# Patient Record
Sex: FEMALE | Race: WHITE | NOT HISPANIC OR LATINO | Employment: OTHER | URBAN - NONMETROPOLITAN AREA
[De-identification: names, ages, dates, MRNs, and addresses within clinical notes are randomized per-mention and may not be internally consistent; named-entity substitution may affect disease eponyms.]

---

## 2017-05-08 ENCOUNTER — HOSPITAL ENCOUNTER (EMERGENCY)
Facility: HOSPITAL | Age: 62
Discharge: HOME | End: 2017-05-08
Attending: NURSE PRACTITIONER | Admitting: NURSE PRACTITIONER
Payer: COMMERCIAL

## 2017-05-08 VITALS
RESPIRATION RATE: 16 BRPM | OXYGEN SATURATION: 96 % | DIASTOLIC BLOOD PRESSURE: 78 MMHG | TEMPERATURE: 96.6 F | SYSTOLIC BLOOD PRESSURE: 150 MMHG

## 2017-05-08 DIAGNOSIS — S69.91XA FINGER INJURY, RIGHT, INITIAL ENCOUNTER: ICD-10-CM

## 2017-05-08 PROCEDURE — 73140 X-RAY EXAM OF FINGER(S): CPT | Mod: TC,RT

## 2017-05-08 PROCEDURE — 99202 OFFICE O/P NEW SF 15 MIN: CPT | Performed by: NURSE PRACTITIONER

## 2017-05-08 PROCEDURE — 99213 OFFICE O/P EST LOW 20 MIN: CPT

## 2017-05-08 ASSESSMENT — ENCOUNTER SYMPTOMS: CONSTITUTIONAL NEGATIVE: 1

## 2017-05-08 NOTE — ED PROVIDER NOTES
History     Chief Complaint   Patient presents with     Hand Pain     rt small finger pain, notes fell earlier today     The history is provided by the patient. No  was used.     Antonella Orellana is a 61 year old female who presents with pain, bruising and swelling in her R small finger. Fell today at home getting dressed injuring the finger. Has been taking ASA for pain. Pain is throbbing and not subsiding. Limited ROM, sensation intact.     I have reviewed the Medications, Allergies, Past Medical and Surgical History, and Social History in the Epic system.    Review of Systems   Constitutional: Negative.    Musculoskeletal:        Bruising and swelling to R small finger, limited ROM       Physical Exam   BP: 150/78  Heart Rate: 64  Temp: 96.6  F (35.9  C)  Resp: 16  SpO2: 96 %  Physical Exam   Constitutional: She is oriented to person, place, and time. She appears well-developed and well-nourished. No distress.   HENT:   Head: Normocephalic and atraumatic.   Cardiovascular: Normal rate.    Pulmonary/Chest: Effort normal.   Musculoskeletal:        Hands:  Noted area - ecchymosis with swelling, limited ROM. Sensation intact. No abrasions, crepitus or deformity.    Neurological: She is alert and oriented to person, place, and time.   Skin: Skin is warm, dry and intact. She is not diaphoretic.   Nursing note and vitals reviewed.      ED Course     ED Course         R small finger XR: I personally reviewed the x-rays and there is a questionable fracture in MCP joint and middle phalanx vs degenerative changes.  Radiology review pending and nurse will notify patient if there is any change in the treatment plan.    Orthopedic injury tx  Date/Time: 5/8/2017 6:08 PM  Performed by: CARMELA MARIE  Authorized by: CARMELA MARIE   Consent: Verbal consent obtained.  Risks and benefits: risks, benefits and alternatives were discussed  Consent given by: patient  Patient understanding: patient  states understanding of the procedure being performed  Patient identity confirmed: verbally with patient and arm band  Injury location: finger  Location details: right little finger  Injury type: fracture  Fracture type: middle phalanx  MCP joint involved: yes  IP joint involved: no  Pre-procedure neurovascular assessment: neurovascularly intact  Pre-procedure distal perfusion: normal  Pre-procedure neurological function: normal  Pre-procedure range of motion: reduced  Local anesthesia used: no    Anesthesia:  Local anesthesia used: no  Sedation:  Patient sedated: no    Manipulation performed: no  Immobilization: splint  Splint type: static finger  Supplies used: aluminum splint  Post-procedure distal perfusion: normal  Post-procedure neurological function: normal  Post-procedure range of motion: unchanged  Patient tolerance: Patient tolerated the procedure well with no immediate complications        Assessments & Plan (with Medical Decision Making)     I have reviewed the nursing notes.  I have reviewed the findings, diagnosis, plan and need for follow up with the patient.  Wear splint until re-evaluated by PCP in 1 week.   Given a note for airlines to wear a splint.   RICE treatment.   Ibuprofen for pain.   Return here if sx worsen.     Final diagnoses:   Finger injury, right, initial encounter - Possible finger fracture       5/8/2017   [facility]     Wendy Samuel NP  05/08/17 2218

## 2017-05-08 NOTE — DISCHARGE INSTRUCTIONS
Ice, rest and elevate.   Use the splint for protection.   Use ibuprofen for pain.   Follow up with PCP next week for re-evaluation.

## 2017-05-08 NOTE — ED AVS SNAPSHOT
[facility]    [address]    [place] [address]    Phone:  [phone]                                       Antonella Orellana   MRN: [id number]    Department:  [facility]   Date of Visit:  5/8/2017           Patient Information     Date Of Birth          1955        Your diagnoses for this visit were:     Finger injury, right, initial encounter Possible finger fracture       You were seen by Wendy Samuel NP.      Follow-up Information     Follow up with Stephen Seals MD.    Specialty:  Family Practice    Why:  for re-evaluation    Contact information:    [facility] [address]  [address]  [place] [address] [phone]          Discharge Instructions       Ice, rest and elevate.   Use the splint for protection.   Use ibuprofen for pain.   Follow up with PCP next week for re-evaluation.    Discharge References/Attachments     FINGER CONTUSION (ENGLISH)    FRACTURE, FINGER AND TOE (BROKEN FINGER OR TOE) (ENGLISH)         Review of your medicines      Our records show that you are taking the medicines listed below. If these are incorrect, please call your family doctor or clinic.        Dose / Directions Last dose taken    CITALOPRAM HYDROBROMIDE PO   Dose:  20 mg        Take 20 mg by mouth   Refills:  0        LEVOTHYROXINE SODIUM PO   Dose:  12.5 mcg        Take 12.5 mcg by mouth   Refills:  0                Procedures and tests performed during your visit     Fingers XR, 2-3 views, right      Orders Needing Specimen Collection     None      Pending Results     Date and Time Order Name Status Description    5/8/2017 1743 Fingers XR, 2-3 views, right In process             Pending Culture Results     No orders found from 5/6/2017 to 5/9/2017.            Thank you for choosing [facility]       Thank you for choosing [facility] for your care. Our goal is always to provide you with excellent care. Hearing back from our patients is one way we can continue to improve  "our services. Please take a few minutes to complete the written survey that you may receive in the mail after you visit with us. Thank you!        [employer] Information     [employer] lets you send messages to your doctor, view your test results, renew your prescriptions, schedule appointments and more. To sign up, go to [email or web address] . Click on \"Log in\" on the left side of the screen, which will take you to the Welcome page. Then click on \"Sign up Now\" on the right side of the page.     You will be asked to enter the access code listed below, as well as some personal information. Please follow the directions to create your username and password.     Your access code is: [email or web address]  Expires: 2017  6:11 PM     Your access code will  in 90 days. If you need help or a new code, please call your [facility] clinic or [phone].        Care EveryWhere ID     This is your Care EveryWhere ID. This could be used by other organizations to access your [facility] medical records  [id number]        After Visit Summary       This is your record. Keep this with you and show to your community pharmacist(s) and doctor(s) at your next visit.                  "

## 2017-05-08 NOTE — ED NOTES
Patient states she fell earlier today and since has throbbing pain right little finger. Taking aspirins for pain.

## 2017-05-08 NOTE — ED AVS SNAPSHOT
[facility]    [address]    [place] [address]    Phone:  [phone]                                       Antonella Orellana   MRN: [id number]    Department:  [facility]   Date of Visit:  5/8/2017           After Visit Summary Signature Page     I have received my discharge instructions, and my questions have been answered. I have discussed any challenges I see with this plan with the nurse or doctor.    ..........................................................................................................................................  Patient/Patient Representative Signature      ..........................................................................................................................................  Patient Representative Print Name and Relationship to Patient    ..................................................               ................................................  Date                                            Time    ..........................................................................................................................................  Reviewed by Signature/Title    ...................................................              ..............................................  Date                                                            Time

## 2017-05-08 NOTE — LETTER
[facility]  [address] [address]  [phone]    May 8, 2017    Antonella Orellana  [address] [address]  [phone] (home)     : 1955      To whom it may concern:    Antonella Orellana was seen in our Emergency Department today, May 8, 2017 and may have a fracture in her right small finger. She will need to wear a metal finger splint while healing for the next 3 weeks.       Sincerely,        Wendy Samuel, [facility]  6:12 PM  May 8, 2017

## 2017-09-14 ENCOUNTER — HOSPITAL ENCOUNTER (OUTPATIENT)
Dept: OCCUPATIONAL THERAPY | Facility: HOSPITAL | Age: 62
Setting detail: THERAPIES SERIES
End: 2017-09-14
Attending: FAMILY MEDICINE
Payer: COMMERCIAL

## 2017-09-14 PROCEDURE — 97110 THERAPEUTIC EXERCISES: CPT | Mod: GO,59

## 2017-09-14 PROCEDURE — G8988 SELF CARE GOAL STATUS: HCPCS | Mod: GO,CI

## 2017-09-14 PROCEDURE — 97166 OT EVAL MOD COMPLEX 45 MIN: CPT | Mod: GO

## 2017-09-14 PROCEDURE — 40000118 ZZH STATISTIC OT DEPT VISIT

## 2017-09-14 PROCEDURE — G8987 SELF CARE CURRENT STATUS: HCPCS | Mod: GO,CJ

## 2017-09-14 PROCEDURE — 97760 ORTHOTIC MGMT&TRAING 1ST ENC: CPT | Mod: GO

## 2017-09-14 NOTE — PROGRESS NOTES
09/14/17 0803   General Information/History   Start Of Care Date 09/14/17   Referring Physician Dwain Pandey MD   Orders Other   Other Orders spring splint, home program, out pt therapy   Orders Date 09/05/17   Medical Diagnosis PIP volar plate contracture   Additional Occupational Profile Info/Pertinent history of current problem 61 yo right handed female who fell in May 2017 injuring her right small finger.  She had used a metal splint initially, next tried gadiel taping, small finger ended up being a hook.  The knuckle is still big.  Her hand was fine before.   Past medical history thyroid issues   How/Where did it occur With a fall   Onset date of current episode/exacerbation 05/18/17   Chronicity New   Hand Dominance Right   Affected side Right   Functional limitations perform activities of daily living;perform desired leisure / sports activities   Reported Symptoms Pain;Loss of Motion/Stiffness;Loss of strength;Edema   QuickDASH [Functional Disability Questionnaire; 0-100 (0=no dysfunction; 100=dysfunction)] Open Dash   Open Jar 3   Heavy Household Chores 3   Carry a shopping bag 1   Wash back 3   Cut food with knife 1   Recreational activities 3   Social activities 1   Work, daily activities 2   Pain 3   Tingling 1   Sleeping 1   QuickDASH Sum 22   QuickDASH Count 11   QuickDASH Disability/Symptom Score 25   Prior level of function Independent ADL;Independent IADL   Important Activities crocheting, knitting, playing piano, guitar, fiddle, dulcimer   Living [facility]   Patient role/Employment history Retired   Patient/Family goals statement Use of my little finger, get rid of the hook   Pain   Pain Primary Pain Report   Primary Pain Report   Location right small finger PIP   Radiation Does Not Radiate   Pain Quality (uncomfortable)   Frequency Constant   Scale 4/10   Pain Is Same All Of The Time   Pain Is Exacerbated By Activity/movement  (wearing spring splint)   Pain Is Relieved By Nothing    Progression Since Onset Unchanged   Edema   Edema Small   Overall  - Left None   Small (measured in cm)   P1  - Left 4.8   PIP  - Left 4.8   P2  - Left 4.3   P1  - Right 5.2   PIP  - Right 5.7   P2  - Right 4.7   ROM   ROM AROM;PROM   AROM   AROM Small   Small   MCP Extension - Left 0   MCP Extension - Right 0   MCP Flexion - Left 88   MCP Flexion - Right 85   PIP Extension - Left 0   PIP Extension - Right -39   PIP Flexion - Left 101   PIP Flexion - Right 84   DIP Extension - Left 0   DIP Extension - Right -8   DIP Flexion - Left 93   DIP Flexion - Right 54   PROM   PROM Small   Small   PIP Extension - Right -36   PIP Flexion - Right 98   Strength   Strength Strength    Avg - Left 58    Avg - Right 51   Therapy Interventions   Planned Therapy Interventions Ultrasound;Paraffin;Stretching;Splinting;Edema Management   Clinical Impression   Criteria for Skilled Therapeutic Interventions Met yes;treatment indicated   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 Yes   Patient, Family & other staff in agreement with plan of care Yes   Hand Goals   Hand Goals Household Chores;Work;Music   Household Chores   Current Functional Task Vacuuming   Previous Performance Level Independent   Current Performance Level Moderate difficulty   Goal Target Task Hold and use vacuum    Goal Target Performance Level Pain free   Due Date 10/12/17   Work   Current Functional Task Keyboarding   Previous Performance Level  Independent   Current Performance Level Moderate difficulty   Goal Target Task Type   Goal Target Performance Level No difficulty   Due Date 10/12/17   Music   Current Functional Task Playing piano   Previous Performance Level No difficulty   Current Performance Level Moderate difficulty   Goal Target Task Play piano   Goal Target Performance Level No difficulty   Due Date 10/12/17   Total Evaluation Time   Total Evaluation Time (Minutes) 27

## 2017-10-09 NOTE — PROGRESS NOTES
10/09/2017    Upon reviewing charts, I realized I had forgotten to send authorization request to [facility] for follow-up appointments with pt.  Called to apologize and ask if she would still like to come for therapy.  Pt stated she's been doing her home program and feels she is progressing all right on her own and she doesn't want to travel if not necessary.  She would like the option to come later if necessary.  OT will keep this case open until the end of Dec.  If she decides she wants to come back she's been advised to call so OT can request authorization then.

## 2018-01-04 NOTE — PROGRESS NOTES
1/4/2017    Pt was evaluated 9/14/2017 following a volar plate fx.  She was given a home  Program and OT was to seek authorization from her insurer for her treatment, but unfortunately did not.  When this mistake was realized pt was called.  She reported at that time that she was doing well with her HEP and did not wish to travel for therapy if not necessary.  OT was put on hold until the end of December and pt was advised she could return anytime before then if she felt the need.  Pt has not contacted this office since.  OT is therefore discharged.

## 2023-05-08 ENCOUNTER — HOSPITAL ENCOUNTER (EMERGENCY)
Facility: HOSPITAL | Age: 68
Discharge: HOME | End: 2023-05-08
Attending: PHYSICIAN ASSISTANT | Admitting: PHYSICIAN ASSISTANT
Payer: COMMERCIAL

## 2023-05-08 ENCOUNTER — APPOINTMENT (OUTPATIENT)
Dept: GENERAL RADIOLOGY | Facility: HOSPITAL | Age: 68
End: 2023-05-08
Attending: PHYSICIAN ASSISTANT
Payer: COMMERCIAL

## 2023-05-08 VITALS
OXYGEN SATURATION: 96 % | RESPIRATION RATE: 16 BRPM | TEMPERATURE: 98 F | SYSTOLIC BLOOD PRESSURE: 133 MMHG | HEART RATE: 63 BPM | DIASTOLIC BLOOD PRESSURE: 68 MMHG

## 2023-05-08 DIAGNOSIS — J22 LOWER RESPIRATORY INFECTION: ICD-10-CM

## 2023-05-08 LAB
ANION GAP SERPL CALCULATED.3IONS-SCNC: 7 MMOL/L (ref 7–15)
BASOPHILS # BLD AUTO: 0 10E3/UL (ref 0–0.2)
BASOPHILS NFR BLD AUTO: 0 %
BUN SERPL-MCNC: 7.3 MG/DL (ref 8–23)
CALCIUM SERPL-MCNC: 9.6 MG/DL (ref 8.8–10.2)
CHLORIDE SERPL-SCNC: 102 MMOL/L (ref 98–107)
CREAT SERPL-MCNC: 0.73 MG/DL (ref 0.51–0.95)
D DIMER PPP FEU-MCNC: 0.4 UG/ML FEU (ref 0–0.5)
DEPRECATED HCO3 PLAS-SCNC: 29 MMOL/L (ref 22–29)
EOSINOPHIL # BLD AUTO: 0.1 10E3/UL (ref 0–0.7)
EOSINOPHIL NFR BLD AUTO: 2 %
ERYTHROCYTE [DISTWIDTH] IN BLOOD BY AUTOMATED COUNT: 12.4 % (ref 10–15)
GFR SERPL CREATININE-BSD FRML MDRD: 89 ML/MIN/1.73M2
GLUCOSE SERPL-MCNC: 107 MG/DL (ref 70–99)
HCT VFR BLD AUTO: 41.4 % (ref 35–47)
HGB BLD-MCNC: 14.2 G/DL (ref 11.7–15.7)
HOLD SPECIMEN: NORMAL
HOLD SPECIMEN: NORMAL
IMM GRANULOCYTES # BLD: 0 10E3/UL
IMM GRANULOCYTES NFR BLD: 0 %
LYMPHOCYTES # BLD AUTO: 1.1 10E3/UL (ref 0.8–5.3)
LYMPHOCYTES NFR BLD AUTO: 24 %
MCH RBC QN AUTO: 31 PG (ref 26.5–33)
MCHC RBC AUTO-ENTMCNC: 34.3 G/DL (ref 31.5–36.5)
MCV RBC AUTO: 90 FL (ref 78–100)
MONOCYTES # BLD AUTO: 0.4 10E3/UL (ref 0–1.3)
MONOCYTES NFR BLD AUTO: 9 %
NEUTROPHILS # BLD AUTO: 3 10E3/UL (ref 1.6–8.3)
NEUTROPHILS NFR BLD AUTO: 65 %
NRBC # BLD AUTO: 0 10E3/UL
NRBC BLD AUTO-RTO: 0 /100
NT-PROBNP SERPL-MCNC: 190 PG/ML (ref 0–900)
PLATELET # BLD AUTO: 234 10E3/UL (ref 150–450)
POTASSIUM SERPL-SCNC: 4.3 MMOL/L (ref 3.4–5.3)
RBC # BLD AUTO: 4.58 10E6/UL (ref 3.8–5.2)
SODIUM SERPL-SCNC: 138 MMOL/L (ref 136–145)
TROPONIN T SERPL HS-MCNC: <6 NG/L
WBC # BLD AUTO: 4.6 10E3/UL (ref 4–11)

## 2023-05-08 PROCEDURE — 85379 FIBRIN DEGRADATION QUANT: CPT | Performed by: PHYSICIAN ASSISTANT

## 2023-05-08 PROCEDURE — 85025 COMPLETE CBC W/AUTO DIFF WBC: CPT | Performed by: PHYSICIAN ASSISTANT

## 2023-05-08 PROCEDURE — 99285 EMERGENCY DEPT VISIT HI MDM: CPT | Mod: 25

## 2023-05-08 PROCEDURE — 84484 ASSAY OF TROPONIN QUANT: CPT | Performed by: PHYSICIAN ASSISTANT

## 2023-05-08 PROCEDURE — 36415 COLL VENOUS BLD VENIPUNCTURE: CPT | Performed by: PHYSICIAN ASSISTANT

## 2023-05-08 PROCEDURE — 82310 ASSAY OF CALCIUM: CPT | Performed by: PHYSICIAN ASSISTANT

## 2023-05-08 PROCEDURE — 71046 X-RAY EXAM CHEST 2 VIEWS: CPT

## 2023-05-08 PROCEDURE — 93005 ELECTROCARDIOGRAM TRACING: CPT

## 2023-05-08 PROCEDURE — 99284 EMERGENCY DEPT VISIT MOD MDM: CPT | Performed by: PHYSICIAN ASSISTANT

## 2023-05-08 PROCEDURE — 83880 ASSAY OF NATRIURETIC PEPTIDE: CPT | Performed by: PHYSICIAN ASSISTANT

## 2023-05-08 RX ORDER — ROSUVASTATIN CALCIUM 10 MG/1
1 TABLET, COATED ORAL DAILY
COMMUNITY
Start: 2023-05-05

## 2023-05-08 RX ORDER — LEVOTHYROXINE SODIUM 75 UG/1
1 TABLET ORAL DAILY
COMMUNITY
Start: 2023-04-27

## 2023-05-08 RX ORDER — ALBUTEROL SULFATE 90 UG/1
2 AEROSOL, METERED RESPIRATORY (INHALATION) EVERY 6 HOURS PRN
Qty: 18 G | Refills: 0 | Status: SHIPPED | OUTPATIENT
Start: 2023-05-08

## 2023-05-08 RX ORDER — ESCITALOPRAM OXALATE 20 MG/1
1 TABLET ORAL DAILY
COMMUNITY
Start: 2023-04-27

## 2023-05-08 RX ORDER — AZITHROMYCIN 250 MG/1
TABLET, FILM COATED ORAL
Qty: 6 TABLET | Refills: 0 | Status: SHIPPED | OUTPATIENT
Start: 2023-05-08 | End: 2023-05-13

## 2023-05-08 ASSESSMENT — ENCOUNTER SYMPTOMS
ABDOMINAL PAIN: 0
SHORTNESS OF BREATH: 1
CHEST TIGHTNESS: 1
NEUROLOGICAL NEGATIVE: 1
FEVER: 0
BRUISES/BLEEDS EASILY: 0
APPETITE CHANGE: 0
COUGH: 1
CHILLS: 0
ACTIVITY CHANGE: 1
FATIGUE: 1

## 2023-05-08 ASSESSMENT — ACTIVITIES OF DAILY LIVING (ADL): ADLS_ACUITY_SCORE: 35

## 2023-05-08 NOTE — ED TRIAGE NOTES
Patient reports productive cough, SOB, chest heaviness x1.5 weeks. Not improving. Denies fever/ chills. Hx of PNA.      Triage Assessment     Row Name 05/08/23 1106       Triage Assessment (Adult)    Airway WDL WDL       Respiratory WDL    Respiratory WDL cough;rhythm/pattern    Rhythm/Pattern, Respiratory shortness of breath    Cough Frequency frequent    Cough Type productive

## 2023-05-08 NOTE — ED PROVIDER NOTES
History     Chief Complaint   Patient presents with     Shortness of Breath     The history is provided by the patient.     Antonella Orellana is a 67 year old female who presented to the emergency department ambulatory for evaluation of approximate 10 to 12-day history of productive cough, shortness of breath, and chest heaviness.  No hemoptysis.  No fevers.  She does vape.  She does have some worsening dyspnea with exertion.  No diaphoresis or vomiting.  No history of coronary artery disease.  No unilateral leg swelling.    Allergies:  Allergies   Allergen Reactions     Seasonal Allergies      Other reaction(s): Runny Nose  Seasonal allergies        Problem List:    There are no problems to display for this patient.       Past Medical History:    No past medical history on file.    Past Surgical History:    No past surgical history on file.    Family History:    No family history on file.    Social History:  Marital Status:   [2]  Social History     Tobacco Use     Smoking status: Never        Medications:    albuterol (PROAIR HFA/PROVENTIL HFA/VENTOLIN HFA) 108 (90 Base) MCG/ACT inhaler  azithromycin (ZITHROMAX) 250 MG tablet  escitalopram (LEXAPRO) 20 MG tablet  levothyroxine (SYNTHROID/LEVOTHROID) 75 MCG tablet  rosuvastatin (CRESTOR) 10 MG tablet          Review of Systems   Constitutional: Positive for activity change and fatigue. Negative for appetite change, chills and fever.   Respiratory: Positive for cough, chest tightness and shortness of breath.    Gastrointestinal: Negative for abdominal pain.   Skin: Negative.    Allergic/Immunologic: Negative for immunocompromised state.   Neurological: Negative.    Hematological: Does not bruise/bleed easily.       Physical Exam   BP: 134/83  Pulse: 66  Temp: 97.5  F (36.4  C)  Resp: 16  SpO2: 94 %      Physical Exam  Vitals and nursing note reviewed.   Constitutional:       General: She is not in acute distress.     Appearance: Normal appearance. She is  normal weight. She is not ill-appearing, toxic-appearing or diaphoretic.      Comments: Pleasant and talkative 67-year-old female found in Fowlers position in no distress.   HENT:      Head: Normocephalic and atraumatic.      Mouth/Throat:      Mouth: Mucous membranes are moist.      Pharynx: Oropharynx is clear.   Eyes:      Extraocular Movements: Extraocular movements intact.      Conjunctiva/sclera: Conjunctivae normal.   Cardiovascular:      Rate and Rhythm: Normal rate and regular rhythm.   Pulmonary:      Effort: Pulmonary effort is normal.      Breath sounds: Normal breath sounds.      Comments: Harsh productive cough  Abdominal:      Palpations: Abdomen is soft.   Musculoskeletal:      Cervical back: Normal range of motion and neck supple.      Right lower leg: No edema.      Left lower leg: No edema.   Skin:     General: Skin is warm and dry.      Capillary Refill: Capillary refill takes less than 2 seconds.   Neurological:      General: No focal deficit present.      Mental Status: She is alert and oriented to person, place, and time.   Psychiatric:         Mood and Affect: Mood normal.         Behavior: Behavior normal.         ED Course              ED Course as of 05/08/23 1344   Mon May 08, 2023   1235 My independent review of the EKG shows a normal sinus rhythm at a rate of 62.  Normal MS interval.  Normal QRS duration.  Normal QTc.  Normal axis.  There are no concerning ST segments.  There are no concerning T waves.  There is no evidence of ectopy, preexcitation, or ischemia.  The patient does have evidence of low voltage QRS in the precordial leads.  There are no previous EKGs for comparison.   1254 Plan will be to add on D-dimer for possible pulmonary embolism as a source of the patient's chest pressure and dyspnea.   1254 My independent review of the chest x-ray shows no evidence of focal consolidation, pneumothorax, or widened mediastinum.     Procedures              Critical Care time:  none                Results for orders placed or performed during the hospital encounter of 05/08/23 (from the past 24 hour(s))   CBC with platelets differential    Narrative    The following orders were created for panel order CBC with platelets differential.  Procedure                               Abnormality         Status                     ---------                               -----------         ------                     CBC with platelets and d...[[id number]]                      Final result                 Please view results for these tests on the individual orders.   Basic metabolic panel   Result Value Ref Range    Sodium 138 136 - 145 mmol/L    Potassium 4.3 3.4 - 5.3 mmol/L    Chloride 102 98 - 107 mmol/L    Carbon Dioxide (CO2) 29 22 - 29 mmol/L    Anion Gap 7 7 - 15 mmol/L    Urea Nitrogen 7.3 (L) 8.0 - 23.0 mg/dL    Creatinine 0.73 0.51 - 0.95 mg/dL    Calcium 9.6 8.8 - 10.2 mg/dL    Glucose 107 (H) 70 - 99 mg/dL    GFR Estimate 89 >60 mL/min/1.73m2   Troponin T, High Sensitivity   Result Value Ref Range    Troponin T, High Sensitivity <6 <=14 ng/L   Nt probnp inpatient (BNP)   Result Value Ref Range    N terminal Pro BNP Inpatient 190 0 - 900 pg/mL   CBC with platelets and differential   Result Value Ref Range    WBC Count 4.6 4.0 - 11.0 10e3/uL    RBC Count 4.58 3.80 - 5.20 10e6/uL    Hemoglobin 14.2 11.7 - 15.7 g/dL    Hematocrit 41.4 35.0 - 47.0 %    MCV 90 78 - 100 fL    MCH 31.0 26.5 - 33.0 pg    MCHC 34.3 31.5 - 36.5 g/dL    RDW 12.4 10.0 - 15.0 %    Platelet Count 234 150 - 450 10e3/uL    % Neutrophils 65 %    % Lymphocytes 24 %    % Monocytes 9 %    % Eosinophils 2 %    % Basophils 0 %    % Immature Granulocytes 0 %    NRBCs per 100 WBC 0 <1 /100    Absolute Neutrophils 3.0 1.6 - 8.3 10e3/uL    Absolute Lymphocytes 1.1 0.8 - 5.3 10e3/uL    Absolute Monocytes 0.4 0.0 - 1.3 10e3/uL    Absolute Eosinophils 0.1 0.0 - 0.7 10e3/uL    Absolute Basophils 0.0 0.0 - 0.2 10e3/uL    Absolute Immature  Granulocytes 0.0 <=0.4 10e3/uL    Absolute NRBCs 0.0 10e3/uL   D dimer quantitative   Result Value Ref Range    D-Dimer Quantitative 0.40 0.00 - 0.50 ug/mL FEU    Narrative    This D-dimer assay is intended for use in conjunction with a clinical pretest probability assessment model to exclude pulmonary embolism (PE) and deep venous thrombosis (DVT) in outpatients suspected of PE or DVT. The cut-off value is 0.50 ug/mL FEU.   XR Chest 2 Views    Narrative    Procedure:XR CHEST 2 VIEWS    Clinical history:Female, 67 years, Cough and shortness of breath    Technique: Two views are submitted.    Comparison: No relevant prior imaging.    Findings: The cardiac silhouette is normal. The pulmonary vasculature  is normal.    The lungs are clear. Bony structures are unremarkable.      Impression    Impression:   No acute abnormality. No evidence of acute or active cardiopulmonary  disease.    LISA DEE MD         SYSTEM ID:  [id number]   Extra Tube    Narrative    The following orders were created for panel order Extra Tube.  Procedure                               Abnormality         Status                     ---------                               -----------         ------                     Extra Red Top Tube[[id number]]                               In process                   Please view results for these tests on the individual orders.       Medications - No data to display    Assessments & Plan (with Medical Decision Making)   67-year-old female with approximate 10 to 12-day history of shortness of breath, cough, and dyspnea with exertion.  She does vape.  Chest x-ray shows no concerning findings or focal consolidation.  Laboratory evaluation is entirely unremarkable including a normal troponin, D-dimer, and BNP.  There is no anemia.  The EKG shows no evidence of ischemia.  We will trial outpatient azithromycin and albuterol given the patient's duration of symptoms that seem to be worsening rather than  improving.  The patient voiced complete understanding and was quite happy and agreeable.  She will return here for any new or worsening symptoms.  She will follow-up in the clinic for persistent symptoms.    This document was prepared using a combination of typing and voice generated software.  While every attempt was made for accuracy, spelling and grammatical errors may exist.      I have reviewed the nursing notes.    I have reviewed the findings, diagnosis, plan and need for follow up with the patient.           Medical Decision Making  The patient's presentation was of moderate complexity (an undiagnosed new problem with uncertain diagnosis).    The patient's evaluation involved:  review of 3+ test result(s) ordered prior to this encounter (Multiple labs and chest x-ray)    The patient's management necessitated moderate risk (prescription drug management including medications given in the ED).        New Prescriptions    ALBUTEROL (PROAIR HFA/PROVENTIL HFA/VENTOLIN HFA) 108 (90 BASE) MCG/ACT INHALER    Inhale 2 puffs into the lungs every 6 hours as needed for shortness of breath, wheezing or cough    AZITHROMYCIN (ZITHROMAX) 250 MG TABLET    Take 2 tablets (500 mg) by mouth daily for 1 day, THEN 1 tablet (250 mg) daily for 4 days.       Final diagnoses:   Lower respiratory infection       5/8/2023   [facility]     Althea Dyer PA-C  05/08/23 0954

## 2023-05-08 NOTE — DISCHARGE INSTRUCTIONS
Your work-up in the emergency department did not show any concerning findings of significant pneumonia, heart damage, or other concerns.  I have prescribed you medications to hopefully treat your symptoms.  Please follow-up in the clinic for further evaluation.  Please return here for any new or worsening symptoms.